# Patient Record
Sex: MALE | Race: WHITE | NOT HISPANIC OR LATINO | URBAN - METROPOLITAN AREA
[De-identification: names, ages, dates, MRNs, and addresses within clinical notes are randomized per-mention and may not be internally consistent; named-entity substitution may affect disease eponyms.]

---

## 2017-10-23 ENCOUNTER — EMERGENCY (EMERGENCY)
Facility: HOSPITAL | Age: 32
LOS: 0 days | Discharge: HOME | End: 2017-10-23

## 2017-10-23 DIAGNOSIS — Z23 ENCOUNTER FOR IMMUNIZATION: ICD-10-CM
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
## 2017-10-23 DIAGNOSIS — S61.210A LACERATION WITHOUT FOREIGN BODY OF RIGHT INDEX FINGER WITHOUT DAMAGE TO NAIL, INITIAL ENCOUNTER: ICD-10-CM
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
## 2017-10-23 DIAGNOSIS — Z87.891 PERSONAL HISTORY OF NICOTINE DEPENDENCE: ICD-10-CM
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
## 2017-11-06 ENCOUNTER — EMERGENCY (EMERGENCY)
Facility: HOSPITAL | Age: 32
LOS: 0 days | Discharge: HOME | End: 2017-11-06

## 2017-11-06 DIAGNOSIS — S61.210D LACERATION WITHOUT FOREIGN BODY OF RIGHT INDEX FINGER WITHOUT DAMAGE TO NAIL, SUBSEQUENT ENCOUNTER: ICD-10-CM
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
## 2021-06-25 ENCOUNTER — EMERGENCY (EMERGENCY)
Facility: HOSPITAL | Age: 36
LOS: 0 days | Discharge: HOME | End: 2021-06-25
Attending: EMERGENCY MEDICINE | Admitting: EMERGENCY MEDICINE
Payer: MEDICAID

## 2021-06-25 VITALS
HEART RATE: 80 BPM | OXYGEN SATURATION: 98 % | RESPIRATION RATE: 18 BRPM | SYSTOLIC BLOOD PRESSURE: 141 MMHG | TEMPERATURE: 98 F | DIASTOLIC BLOOD PRESSURE: 71 MMHG

## 2021-06-25 DIAGNOSIS — F17.200 NICOTINE DEPENDENCE, UNSPECIFIED, UNCOMPLICATED: ICD-10-CM
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
## 2021-06-25 DIAGNOSIS — M25.531 PAIN IN RIGHT WRIST: ICD-10-CM
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
## 2021-06-25 PROCEDURE — 73110 X-RAY EXAM OF WRIST: CPT | Mod: 26,RT

## 2021-06-25 PROCEDURE — 99283 EMERGENCY DEPT VISIT LOW MDM: CPT

## 2021-06-25 NOTE — ED PROVIDER NOTE - PATIENT PORTAL LINK FT
You can access the FollowMyHealth Patient Portal offered by [facility] by registering at the following website: [email or web address]. By joining [employer]s FollowMyHealth portal, you will also be able to view your health information using other applications (apps) compatible with our system.

## 2021-06-25 NOTE — ED PROVIDER NOTE - CARE PROVIDER_API CALL
Ej Morrow (MD)  Orthopaedic Surgery  [address] [place] [address]  Phone: [phone]  Fax: [phone]  Follow Up Time: 1-3 Days

## 2021-06-25 NOTE — ED PROVIDER NOTE - CLINICAL SUMMARY MEDICAL DECISION MAKING FREE TEXT BOX
Character as well as exam low suspicion for fx, and Xray negative. Character more c/w sprain. Neurovascularly intact. Patient is well appearing, NAD, afebrile, hemodynamically stable. Any available tests and studies were discussed with patient. ACE wrapped. Discharged with instructions in further symptomatic care, return precautions, and need for ortho f/u.

## 2021-06-25 NOTE — ED PROVIDER NOTE - PHYSICAL EXAMINATION
Afebrile, hemodynamically stable, saturating well  NAD, well appearing, sitting comfortably in bed  Head NCAT  EOMI grossly  Breathing comfortably on RA  AAO, CN's 3-12 grossly intact  Skin warm, well perfused, no rashes or hives  No swelling/erythema/stepoff/deformity, no bony TTP, entirely nml external exam, full wrist flex/extension, full fingers to thumb, finger abduction, nml warmth/color/sensation

## 2021-06-25 NOTE — ED PROVIDER NOTE - OBJECTIVE STATEMENT
36yoM prev healthy presents with R ulnar wrist pain, onset after he leaned backward to lift something and he felt a pop, since then it hurts primarily with ulnar deviation of wrist, declines analgesia at this time. Denies numbness/tingling and all other symptoms.

## 2021-06-25 NOTE — ED PROVIDER NOTE - NSFOLLOWUPINSTRUCTIONS_ED_ALL_ED_FT
Please follow up with an orthopedist in 1-2 days.  Please use ibuprofen or acetaminophen as needed for pain.  Please keep the ACE bandage on.  Please return to the emergency department if you have worsening pain, swelling, change in feeling or color, or any other symptoms.      Wrist Pain, Adult    There are many things that can cause wrist pain. Some common causes include:  •An injury to the wrist.  •Using the joint too much.  •A condition that causes too much pressure to be put on a nerve in the wrist (carpal tunnel syndrome).  •Wear and tear of the joints that happens as a person gets older (osteoarthritis).  •A condition that causes swelling and stiffness in the joints (arthritis).    Sometimes, the cause of wrist pain is not known.    Often, the pain goes away when you follow your doctor's instructions for easing pain at home. This may include resting your wrist, icing your wrist, or using a splint or an elastic wrap for a short time. It is important to tell your doctor if your wrist pain does not go away.    Follow these instructions at home:    If you have a splint or elastic wrap:     •Wear the splint or wrap as told by your doctor. Take it off only as told by your doctor. Ask if you can take it off for bathing.  •Loosen the splint or wrap if your fingers:  •Tingle.  •Become numb.  •Turn cold and blue.  •Check the skin around the splint or wrap every day. Tell your doctor about any concerns.  •Keep the splint or wrap clean.  •If the splint or wrap is not waterproof:  •Do not let it get wet.  •Cover it with a watertight covering when you take a bath or shower.    Managing pain, stiffness, and swelling   •If told, put ice on the painful area. To do this:  •If you have a removable splint or wrap, take it off as told by your doctor.  •Put ice in a plastic bag.  •Place a towel between your skin and the bag or between your splint or wrap and the bag.  •Leave the ice on for 20 minutes, 2–3 times a day.  •Move your fingers often.  •Raise (elevate) the injured area above the level of your heart while you are sitting or lying down.    Activity  •Rest your wrist as told by your doctor.  •Return to your normal activities as told by your doctor. Ask your doctor what activities are safe for you.  •Ask your doctor when it is safe to drive if you have a splint or wrap on your wrist.  •Do exercises as told by your doctor.    General instructions   •Pay attention to any changes in your symptoms.  •Take over-the-counter and prescription medicines only as told by your doctor.  •Keep all follow-up visits as told by your doctor. This is important.    Contact a doctor if:  •You have a sudden, sharp pain in the wrist, hand, or arm that is different or new.  •The swelling or bruising on your wrist or hand gets worse.  •Your skin:  •Becomes red.  •Gets a rash.  •Has open sores.  •Your pain does not get better.  •Your pain gets worse.  •You have a fever or chills.    Get help right away if:  •You lose feeling in your fingers or hand.  •Your fingers turn white, very red, or cold and blue.  •You cannot move your fingers.    Summary  •There are many things that can cause wrist pain.  •It is important to tell your doctor if your wrist pain does not go away.  •You may need to wear a splint or a wrap for a short period of time.  •Return to your normal activities as told by your doctor. Ask your doctor what activities are safe for you.    This information is not intended to replace advice given to you by your health care provider. Make sure you discuss any questions you have with your health care provider.

## 2023-05-21 ENCOUNTER — EMERGENCY (EMERGENCY)
Facility: HOSPITAL | Age: 38
LOS: 0 days | Discharge: HOME | End: 2023-05-21
Attending: EMERGENCY MEDICINE
Payer: MEDICAID

## 2023-05-21 VITALS
RESPIRATION RATE: 18 BRPM | HEART RATE: 71 BPM | HEIGHT: 68 IN | DIASTOLIC BLOOD PRESSURE: 89 MMHG | SYSTOLIC BLOOD PRESSURE: 165 MMHG | TEMPERATURE: 97 F | WEIGHT: 175.05 LBS | OXYGEN SATURATION: 100 %

## 2023-05-21 DIAGNOSIS — M79.675 PAIN IN LEFT TOE(S): ICD-10-CM

## 2023-05-21 DIAGNOSIS — M10.9 GOUT, UNSPECIFIED: ICD-10-CM

## 2023-05-21 PROCEDURE — 99283 EMERGENCY DEPT VISIT LOW MDM: CPT

## 2023-05-21 NOTE — ED PROVIDER NOTE - CLINICAL SUMMARY MEDICAL DECISION MAKING FREE TEXT BOX
Patient presenting with atraumatic left great toe pain as documented x3 days.  Endorses drinking beer last night which she thinks is related.  Otherwise on arrival patient afebrile, hemodynamically stable, fully neurovascularly intact.  Exam consistent with gout.  No other signs of cellulitis on exam, no fluctuance.  Given the above, will prescribe course of p.o. prednisone, outpatient podiatry follow-up.  Patient agreeable with plan. Agrees to return to ED for any new or worsening symptoms.

## 2023-05-21 NOTE — ED PROVIDER NOTE - OBJECTIVE STATEMENT
38 year old male, no significant pmhx presenting with atraumatic L great toe pain as documented x 3 days. No hx of this in the past. Otherwise denies fevers, numbness, N/V/D or any other complaints.

## 2023-05-21 NOTE — ED PROVIDER NOTE - NSFOLLOWUPCLINICS_GEN_ALL_ED_FT
[facility]  Podiatry  .  [place]   Phone: [phone]  Fax:   Follow Up Time: 1-3 Days

## 2023-05-21 NOTE — ED PROVIDER NOTE - PATIENT PORTAL LINK FT
You can access the FollowMyHealth Patient Portal offered by [facility] by registering at the following website: [email or web address]. By joining [employer]’s FollowMyHealth portal, you will also be able to view your health information using other applications (apps) compatible with our system.

## 2023-05-21 NOTE — ED PROVIDER NOTE - PHYSICAL EXAMINATION
Vital Signs: I have reviewed the initial vital signs.  Constitutional: NAD, well-nourished, appears stated age, no acute distress.  HEENT: Airway patent, moist MM, no erythema/swelling/deformity of oral structures. EOMI, PERRLA.  CV: regular rate, regular rhythm, well-perfused extremities, 2+ b/l DP and radial pulses equal.  Lungs: BCTA, no increased WOB.  ABD: NTND, no guarding or rebound, no pulsatile mass, no hernias.   MSK: Neck supple, nontender, nl ROM, no stepoff. Chest nontender. Back nontender in TLS spine or to b/l bony structures or flanks. (+) L great toe with erythema and warmth, no fluctuance or induration, able to range full toe but with pain. Other ext nontender, nl rom, no deformity.   INTEG: Skin warm, dry, no rash.  NEURO: A&Ox3, normal strength, nl sensation throughout, normal speech.   PSYCH: Calm, cooperative, normal affect and interaction.

## 2025-05-27 ENCOUNTER — EMERGENCY (EMERGENCY)
Facility: HOSPITAL | Age: 40
LOS: 0 days | Discharge: HOME | End: 2025-05-27
Attending: STUDENT IN AN ORGANIZED HEALTH CARE EDUCATION/TRAINING PROGRAM
Payer: COMMERCIAL

## 2025-05-27 VITALS
SYSTOLIC BLOOD PRESSURE: 148 MMHG | TEMPERATURE: 98 F | OXYGEN SATURATION: 97 % | HEART RATE: 99 BPM | RESPIRATION RATE: 18 BRPM | DIASTOLIC BLOOD PRESSURE: 91 MMHG

## 2025-05-27 DIAGNOSIS — M79.675 PAIN IN LEFT TOE(S): ICD-10-CM

## 2025-05-27 DIAGNOSIS — F17.210 NICOTINE DEPENDENCE, CIGARETTES, UNCOMPLICATED: ICD-10-CM

## 2025-05-27 DIAGNOSIS — M10.9 GOUT, UNSPECIFIED: ICD-10-CM

## 2025-05-27 PROBLEM — Z78.9 OTHER SPECIFIED HEALTH STATUS: Chronic | Status: ACTIVE | Noted: 2023-05-28

## 2025-05-27 PROCEDURE — 99283 EMERGENCY DEPT VISIT LOW MDM: CPT | Mod: 25

## 2025-05-27 PROCEDURE — 99283 EMERGENCY DEPT VISIT LOW MDM: CPT

## 2025-05-27 PROCEDURE — 96372 THER/PROPH/DIAG INJ SC/IM: CPT

## 2025-05-27 RX ORDER — KETOROLAC TROMETHAMINE 30 MG/ML
30 INJECTION, SOLUTION INTRAMUSCULAR; INTRAVENOUS ONCE
Refills: 0 | Status: DISCONTINUED | OUTPATIENT
Start: 2025-05-27 | End: 2025-05-27

## 2025-05-27 RX ORDER — PREDNISONE 20 MG/1
50 TABLET ORAL ONCE
Refills: 0 | Status: COMPLETED | OUTPATIENT
Start: 2025-05-27 | End: 2025-05-27

## 2025-05-27 RX ORDER — PREDNISONE 20 MG/1
1 TABLET ORAL
Qty: 4 | Refills: 0
Start: 2025-05-27 | End: 2025-05-30

## 2025-05-27 RX ADMIN — PREDNISONE 50 MILLIGRAM(S): 20 TABLET ORAL at 10:51

## 2025-05-27 RX ADMIN — KETOROLAC TROMETHAMINE 30 MILLIGRAM(S): 30 INJECTION, SOLUTION INTRAMUSCULAR; INTRAVENOUS at 10:51

## 2025-05-27 NOTE — ED PROVIDER NOTE - NSFOLLOWUPINSTRUCTIONS_ED_ALL_ED_FT
Please follow up with your primary care physician within 24-72 hours and return immediately if symptoms worsen.    Our Emergency Department Referral Coordinators will be reaching out to you in the next 24-48 hours from 9:00am to 5:00pm with a follow up appointment. Please expect a phone call from the hospital in that time frame. If you do not receive a call or if you have any questions or concerns, you can reach them at   [phone]    Gout    WHAT YOU NEED TO KNOW:    Gout is a form of arthritis that causes severe joint pain, redness, swelling, and stiffness. Acute gout pain starts suddenly, gets worse quickly, and stops on its own. Acute gout can become chronic and cause permanent damage to the joints.    DISCHARGE INSTRUCTIONS:    Return to the emergency department if:     You have severe pain in one or more of your joints that you cannot tolerate.       You have a fever or redness that spreads beyond the joint area.    Contact your healthcare provider if:     You have new symptoms, such as a rash, after you start gout treatment.       Your joint pain and swelling do not go away, even after treatment.      You are not urinating as much or as often as you usually do.       You have trouble taking your gout medicines.      You have questions or concerns about your condition or care.    Medicines: You may need any of the following:     Prescription pain medicine may be given. Ask your healthcare provider how to take this medicine safely. Some prescription pain medicines contain acetaminophen. Do not take other medicines that contain acetaminophen without talking to your healthcare provider. Too much acetaminophen may cause liver damage. Prescription pain medicine may cause constipation. Ask your healthcare provider how to prevent or treat constipation.       NSAIDs, such as ibuprofen, help decrease swelling, pain, and fever. This medicine is available with or without a doctor's order. NSAIDs can cause stomach bleeding or kidney problems in certain people. If you take blood thinner medicine, always ask your healthcare provider if NSAIDs are safe for you. Always read the medicine label and follow directions.      Gout medicine decreases joint pain and swelling. It may also be given to prevent new gout attacks.       Steroids reduce inflammation and can help your joint stiffness and pain during gout attacks.      Uric acid medicine may be given to reduce the amount of uric acid your body makes. Some medicines may help you pass more uric acid when you urinate.       Take your medicine as directed. Contact your healthcare provider if you think your medicine is not helping or if you have side effects. Tell him or her if you are allergic to any medicine. Keep a list of the medicines, vitamins, and herbs you take. Include the amounts, and when and why you take them. Bring the list or the pill bottles to follow-up visits. Carry your medicine list with you in case of an emergency.    Follow up with your healthcare provider as directed: Write down your questions so you remember to ask them during your visits.    Manage gout:     Rest your painful joint so it can heal. Your healthcare provider may recommend crutches or a walker if the affected joint is in a leg.       Apply ice to your joint. Ice decreases pain and swelling. Use an ice pack, or put crushed ice in a plastic bag. Cover the ice pack or bag with a towel before you apply it to your painful joint. Apply ice for 15 to 20 minutes every hour, or as directed.      Elevate your joint. Elevation helps reduce swelling and pain. Raise your joint above the level of your heart as often as you can. Prop your painful joint on pillows to keep it above your heart comfortably.      Go to physical therapy if directed. A physical therapist can teach you exercises to improve flexibility and range of motion.     Prevent gout attacks:     Do not eat high-purine foods. These foods include meats, seafood, asparagus, spinach, cauliflower, and some types of beans. Healthcare providers may tell you to eat more low-fat milk products, such as yogurt. Milk products may decrease your risk for gout attacks. Vitamin C and coffee may also help. Your healthcare provider or dietitian can help you create a meal plan.      Drink more liquids as directed. Liquids such as water help remove uric acid from your body. Ask how much liquid to drink each day and which liquids are best for you.      Manage your weight. Weight loss may decrease the amount of uric acid in your body. Exercise can help you lose weight. Talk to your healthcare provider about the best exercises for you.      Control your blood sugar level if you have diabetes. Keep your blood sugar level in a normal range. This can help prevent gout attacks.       Limit or do not drink alcohol as directed. Alcohol can trigger a gout attack. Alcohol also increases your risk for dehydration. Ask your healthcare provider if alcohol is safe for you.          © Copyright [employer] 2019 All illustrations and images included in CareNotes are the copyrighted property of [employer] or [employer].

## 2025-05-27 NOTE — ED PROVIDER NOTE - CLINICAL SUMMARY MEDICAL DECISION MAKING FREE TEXT BOX
39 yo M presented to ED with L toe pain. Will tx for gout. Appropriate medications for patient's presenting complaints were ordered and effects were reassessed.  Patient's records (prior hospital, ED visit, and/or nursing home notes if available) were reviewed.  Additional history was obtained from EMS, family, and/or PCP (where available).  Escalation to admission/observation was considered.  However patient feels much better and is comfortable with discharge.  Appropriate follow-up was arranged. Return precautions discussed in detail.

## 2025-05-27 NOTE — ED PROVIDER NOTE - DIFFERENTIAL DIAGNOSIS
The differential diagnosis for patients clinical presentation includes but is not limited to: gout,  sprain, strain Differential Diagnosis

## 2025-05-27 NOTE — ED PROVIDER NOTE - OBJECTIVE STATEMENT
40-year-old male with a past medical history of gout presents complaining of left great toe pain for the past 3 days.  Patient states this feels like his previous gout.  Patient denies taking any medication prior to arrival.  Patient denies any fall/injury/trauma. pt denies any other symptoms including fevers, chill, headache, recent illness/travel, cough, abdominal pain, chest pain, or SOB,

## 2025-05-27 NOTE — ED ADULT NURSE NOTE - NS ED NURSE LEVEL OF CONSCIOUSNESS AFFECT
Patient received from ED alert and await, denied any michelle/discomfort @ this time, admission done, home medications documented, admission orders release. Patient oriented to room, IV fluid started , denied having any money on herself, will continue to monitor.   Calm

## 2025-05-27 NOTE — ED PROVIDER NOTE - ATTENDING APP SHARED VISIT CONTRIBUTION OF CARE
40-year-old male with past medical history presents the emergency department left great toe pain for the past 3 days.  Feels similar to previous gout episodes patient reports recently eating barbecue.  No fevers.  No falls or trauma.  No numbness or tingling.    CONSTITUTIONAL: NAD.   SKIN: warm, dry  HEAD: Normocephalic; atraumatic.   ENT: MMM.   CARD: RRR.   EXT: Normal ROM.  No lower extremity edema. L great toe tenderness. no crepitus.   NEURO: Alert, oriented, grossly unremarkable. strength and sensation intact.

## 2025-05-27 NOTE — ED PROVIDER NOTE - PATIENT PORTAL LINK FT
You can access the FollowMyHealth Patient Portal offered by [facility] by registering at the following website: [email or web address]. By joining [employer]’s FollowMyHealth portal, you will also be able to view your health information using other applications (apps) compatible with our system.

## 2025-05-27 NOTE — ED PROVIDER NOTE - PHYSICAL EXAMINATION
Gen: NAD, AOx3  Head: NCAT  HEENT: PERRL, oral mucosa moist, normal conjunctiva, oropharynx clear without exudate or erythema  Lung: CTAB, no respiratory distress, no wheezing, rales, rhonchi  CV: normal s1/s2, rrr, Normal perfusion, pulses 2+ throughout  MSK: No edema, no visible deformities, full range of motion in all 4 extremities, TTP L great toe with no edema/erythema/crepitus   Neuro: CN II-XII grossly intact, No focal neurologic deficits  Skin: No rash   Psych: normal affect

## 2025-05-29 NOTE — CHART NOTE - NSCHARTNOTEFT_GEN_A_CORE
sent to Sue Medrano 5/28 - TEO / appt scheduled on 6/26 at 1PM at [address] [place] [address] 5/29 - DK (PCP Referral)

## 2025-06-02 ENCOUNTER — NON-APPOINTMENT (OUTPATIENT)
Age: 40
End: 2025-06-02

## 2025-06-02 ENCOUNTER — OUTPATIENT (OUTPATIENT)
Dept: OUTPATIENT SERVICES | Facility: HOSPITAL | Age: 40
LOS: 1 days | End: 2025-06-02
Payer: COMMERCIAL

## 2025-06-02 ENCOUNTER — APPOINTMENT (OUTPATIENT)
Dept: PODIATRY | Facility: CLINIC | Age: 40
End: 2025-06-02
Payer: COMMERCIAL

## 2025-06-02 VITALS — BODY MASS INDEX: 27.28 KG/M2 | WEIGHT: 180 LBS | HEIGHT: 68 IN

## 2025-06-02 DIAGNOSIS — M10.072 IDIOPATHIC GOUT, LEFT ANKLE AND FOOT: ICD-10-CM

## 2025-06-02 DIAGNOSIS — Z00.00 ENCOUNTER FOR GENERAL ADULT MEDICAL EXAMINATION WITHOUT ABNORMAL FINDINGS: ICD-10-CM

## 2025-06-02 PROCEDURE — 99202 OFFICE O/P NEW SF 15 MIN: CPT

## 2025-06-02 RX ORDER — COLCHICINE 0.6 MG/1
0.6 TABLET ORAL
Qty: 20 | Refills: 0 | Status: ACTIVE | COMMUNITY
Start: 2025-06-02 | End: 1900-01-01

## 2025-06-11 DIAGNOSIS — M10.072 IDIOPATHIC GOUT, LEFT ANKLE AND FOOT: ICD-10-CM
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
## 2025-07-23 ENCOUNTER — TRANSCRIPTION ENCOUNTER (OUTPATIENT)
Age: 40
End: 2025-07-23